# Patient Record
Sex: MALE | Race: BLACK OR AFRICAN AMERICAN
[De-identification: names, ages, dates, MRNs, and addresses within clinical notes are randomized per-mention and may not be internally consistent; named-entity substitution may affect disease eponyms.]

---

## 2019-12-09 ENCOUNTER — HOSPITAL ENCOUNTER (EMERGENCY)
Dept: HOSPITAL 74 - JER | Age: 39
Discharge: HOME | End: 2019-12-09
Payer: COMMERCIAL

## 2019-12-09 VITALS — TEMPERATURE: 99.9 F | DIASTOLIC BLOOD PRESSURE: 84 MMHG | HEART RATE: 110 BPM | SYSTOLIC BLOOD PRESSURE: 133 MMHG

## 2019-12-09 VITALS — BODY MASS INDEX: 30.7 KG/M2

## 2019-12-09 DIAGNOSIS — B95.0: ICD-10-CM

## 2019-12-09 DIAGNOSIS — J02.0: Primary | ICD-10-CM

## 2019-12-09 NOTE — PDOC
History of Present Illness





- General


Chief Complaint: Cold Symptoms


Stated Complaint: THROAT PAIN


Time Seen by Provider: 12/09/19 04:12


History Source: Patient


Exam Limitations: No Limitations





- History of Present Illness


Initial Comments: 





12/09/19 04:21


Harsha Morgan is a 38y previously healthy M presenting w sore throat, nasal 

congestion, fevers. Progressively worsening for past 3d. Took motrin without 

relief. Today developed non productive intermittent cough and SOB. Denies 

headache, nausea/vomiting, chest pain. Wants throat swab to rule out strep





Past History





- Past Medical History


Allergies/Adverse Reactions: 


 Allergies











Allergy/AdvReac Type Severity Reaction Status Date / Time


 


No Known Allergies Allergy   Verified 12/09/19 04:31














**Review of Systems





- Review of Systems


Constitutional: Yes: Fever.  No: Chills


HEENTM: Yes: Nose Congestion, Throat Pain.  No: Eye Pain, Recent change in 

vision


Respiratory: Yes: Cough, Shortness of Breath


Cardiac (ROS): No: Chest Pain, Palpitations, Syncope


ABD/GI: No: Abdominal Distended, Constipated, Diarrhea, Nausea, Vomiting


: No: Burning, Dysuria, Discharge, Hematuria


Musculoskeletal: No: Back Pain, Neck Pain


Integumentary: No: Bruising, Flushing, Lesions


Neurological: No: Headache, Seizure, Tingling, Tremors


Psychiatric: No: Anxiety, Depression


Endocrine: No: Excessive Sweating, Flushing, Intolerance to Cold, Intolerance 

to Heat


Hematologic/Lymphatic: No: Anemia, Blood Clots





*Physical Exam





- Physical Exam


General Appearance: Yes: Nourished, Appropriately Dressed.  No: Apparent 

Distress


HEENT: positive: EOMI, MERYL, Normal Voice, Pharynx Normal, Nasal Congestion, 

Rhinorrhea, Hearing Grossly Normal, Other (no anterior tender/swollen 

lymphadenopathy ).  negative: Scleral Icterus (R), Scleral Icterus (L), 

Pharyngeal Erythema, Tonsillar Exudate, Tonsillar Erythema, Sinus Tenderness


Respiratory/Chest: positive: Lungs Clear, Normal Breath Sounds.  negative: 

Chest Tender, Respiratory Distress, Crackles, Rales, Rhonchi, Stridor, Wheezing


Cardiovascular: positive: Regular Rhythm, Regular Rate, S1, S2.  negative: Edema

, Murmur


Integumentary: positive: Normal Color


Neurologic: positive: CNs II-XII NML intact, Fully Oriented, Alert, Normal Mood/

Affect, Normal Response, Responsive.  negative: Sensory Deficit, Confused, 

Disoriented





Medical Decision Making





- Medical Decision Making





12/09/19 04:24


Harsha Morgan is a 38y previously healthy M presenting w sore throat, nasal 

congestion, fevers, cough likely d/t viral URI. Low concern for strep throat (1 

score Centor criteria) vs PNA (clear lung sounds). Pending strep swab. DC home 

w PCP f/u, callback reminder





Discharge





- Discharge Information


Problems reviewed: Yes


Clinical Impression/Diagnosis: 


 Viral URI with cough





Condition: Good


Disposition: HOME





- Admission


No





- Follow up/Referral


Referrals: 


Vinh Ribera [Primary Care Provider] - 


CallBack Reminder: 


Strep test results





- Patient Discharge Instructions


Patient Printed Discharge Instructions:  DI for Viral Upper Respiratory 

Infection -- Adult


Additional Instructions: 


You were seen for sore throat and cough. We will call you if you have strep 

throat





Drink lots of water. You can take tylenol or ibuprofen if you have fevers





Come back to the ED if you have chest pain, trouble breathing, or start 

vomiting.





- Post Discharge Activity

## 2020-08-02 ENCOUNTER — HOSPITAL ENCOUNTER (EMERGENCY)
Dept: HOSPITAL 74 - JER | Age: 40
Discharge: HOME | End: 2020-08-02
Payer: COMMERCIAL

## 2020-08-02 VITALS — HEART RATE: 57 BPM | SYSTOLIC BLOOD PRESSURE: 135 MMHG | DIASTOLIC BLOOD PRESSURE: 86 MMHG | TEMPERATURE: 98.1 F

## 2020-08-02 VITALS — BODY MASS INDEX: 29.8 KG/M2

## 2020-08-02 DIAGNOSIS — R07.9: Primary | ICD-10-CM

## 2020-08-02 DIAGNOSIS — J02.0: ICD-10-CM

## 2020-08-02 LAB
ALBUMIN SERPL-MCNC: 3.9 G/DL (ref 3.4–5)
ALP SERPL-CCNC: 82 U/L (ref 45–117)
ALT SERPL-CCNC: 28 U/L (ref 13–61)
ANION GAP SERPL CALC-SCNC: 4 MMOL/L (ref 8–16)
AST SERPL-CCNC: 24 U/L (ref 15–37)
BASOPHILS # BLD: 1.3 % (ref 0–2)
BILIRUB SERPL-MCNC: 0.5 MG/DL (ref 0.2–1)
BUN SERPL-MCNC: 12.8 MG/DL (ref 7–18)
CALCIUM SERPL-MCNC: 9.5 MG/DL (ref 8.5–10.1)
CHLORIDE SERPL-SCNC: 104 MMOL/L (ref 98–107)
CO2 SERPL-SCNC: 31 MMOL/L (ref 21–32)
CREAT SERPL-MCNC: 1.2 MG/DL (ref 0.55–1.3)
DEPRECATED RDW RBC AUTO: 13.9 % (ref 11.9–15.9)
EOSINOPHIL # BLD: 1.7 % (ref 0–4.5)
GLUCOSE SERPL-MCNC: 97 MG/DL (ref 74–106)
HCT VFR BLD CALC: 39.4 % (ref 35.4–49)
HGB BLD-MCNC: 13 GM/DL (ref 11.7–16.9)
LYMPHOCYTES # BLD: 35.5 % (ref 8–40)
MCH RBC QN AUTO: 27.4 PG (ref 25.7–33.7)
MCHC RBC AUTO-ENTMCNC: 32.9 G/DL (ref 32–35.9)
MCV RBC: 83.2 FL (ref 80–96)
MONOCYTES # BLD AUTO: 10.3 % (ref 3.8–10.2)
NEUTROPHILS # BLD: 51.2 % (ref 42.8–82.8)
PLATELET # BLD AUTO: 278 K/MM3 (ref 134–434)
PMV BLD: 7.8 FL (ref 7.5–11.1)
POTASSIUM SERPLBLD-SCNC: 3.9 MMOL/L (ref 3.5–5.1)
PROT SERPL-MCNC: 7.9 G/DL (ref 6.4–8.2)
RBC # BLD AUTO: 4.74 M/MM3 (ref 4–5.6)
SODIUM SERPL-SCNC: 139 MMOL/L (ref 136–145)
WBC # BLD AUTO: 4.8 K/MM3 (ref 4–10)

## 2020-08-02 NOTE — PDOC
*Physical Exam





- Vital Signs


                                Last Vital Signs











Temp Pulse Resp BP Pulse Ox


 


 98.8 F   72   20   148/89   99 


 


 08/02/20 04:35  08/02/20 04:35  08/02/20 04:35  08/02/20 04:35  08/02/20 04:35














- Physical Exam


General Appearance: Yes: Appropriately Dressed.  No: Apparent Distress


HEENT: positive: EOMI, Normal Voice, Pharyngeal Erythema


Neck: positive: Trachea midline.  negative: Tender


Respiratory/Chest: positive: Lungs Clear, Normal Breath Sounds.  negative: 

Respiratory Distress


Cardiovascular: positive: Regular Rhythm, Regular Rate, S1, S2


Gastrointestinal/Abdominal: positive: Flat, Soft.  negative: Tender


Musculoskeletal: positive: Normal Inspection.  negative: CVA Tenderness


Extremity: positive: Normal Capillary Refill, Normal Inspection, Normal Range of

Motion


Integumentary: positive: Normal Color, Dry, Warm


Neurologic: positive: Fully Oriented, Alert, Normal Mood/Affect





ED Treatment Course





- LABORATORY


CBC & Chemistry Diagram: 


                                 08/02/20 05:57





                                 08/02/20 05:57





- ADDITIONAL ORDERS


Additional order review: 


                               Laboratory  Results











  08/02/20





  05:57


 


Sodium  139


 


Potassium  3.9


 


Chloride  104


 


Carbon Dioxide  31


 


Anion Gap  4 L


 


BUN  12.8


 


Creatinine  1.2


 


Est GFR (CKD-EPI)AfAm  87.75


 


Est GFR (CKD-EPI)NonAf  75.72


 


Random Glucose  97


 


Calcium  9.5


 


Total Bilirubin  0.5


 


AST  24


 


ALT  28


 


Alkaline Phosphatase  82


 


Troponin I  < 0.02


 


Total Protein  7.9


 


Albumin  3.9








                                        











  08/02/20





  05:57


 


RBC  4.74


 


MCV  83.2


 


MCHC  32.9


 


RDW  13.9


 


MPV  7.8


 


Neutrophils %  51.2


 


Lymphocytes %  35.5


 


Monocytes %  10.3 H


 


Eosinophils %  1.7


 


Basophils %  1.3














- Medications


Given in the ED: 


ED Medications














Discontinued Medications














Generic Name Dose Route Start Last Admin





  Trade Name Freq  PRN Reason Stop Dose Admin


 


Aspirin  324 mg  08/02/20 05:37  08/02/20 06:17





  Asa -  PO  08/02/20 05:38  324 mg





  ONCE ONE   Administration














Medical Decision Making





- Medical Decision Making


38 yo male with no PMH presents with chest pain and throat pain. Found to be 

positive for Group A Strep. 








EKG and troponin are negative. 








Streptococcal Group A is positive


Prescribed Penicillin  BID for 10 days. 





Pt stable for discharge.








Discharge





- Discharge Information


Problems reviewed: Yes


Clinical Impression/Diagnosis: 


 Acute streptococcal pharyngitis





Chest pain


Qualifiers:


 Chest pain type: unspecified Qualified Code(s): R07.9 - Chest pain, unspecified





Condition: Stable


Disposition: HOME





- Admission


No





- Additional Discharge Information


Prescriptions: 


Penicillin V Potassium [Pen Vee K -] 500 mg PO BID 10 Days #20 tablet





- Follow up/Referral


Referrals: 


Vinh Ribera [Primary Care Provider] - 





- Patient Discharge Instructions


Additional Instructions: 


Follow up with your PCP for further follow up and evaluation of your condition. 

Take your antibiotic as prescribed (2 pills per day for 10 days). Take tylenol 

to manage your fever as directed by the  instructions. Stay hydrated

and well nourished at home. Return to the ED if your condition worsens and/or 

you experience worsening fevers, chest pain, shortness of breath, rashes, 

nausea, vomiting, abdominal pain. 





- Post Discharge Activity

## 2020-08-02 NOTE — PDOC
History of Present Illness





- General


Chief Complaint: Sore Throat


Stated Complaint: breathing problem





- History of Present Illness


Initial Comments: 





08/02/20 06:04


38 y/o M no significant medical hx, presents to the ED with intermittent chest 

pain. pt reports he has had intermittent episodes for the last few days, lasting

a few minutes. episodes are not accompanied by shortness of breath, nausea, 

vomiting, diaphoresis. Last episode occurred 3 hrs before ED presentation when 

he was heading to the bathroom. Pt denies chest pain right now at the time of 

this hx, He has had associated throat discomfort for the last 2 weeks and his 

daughter recently had a strep throat infection.


He denies any cough, wheezing, fevers, chills, trauma or falls. 


PMHx: as noted above


ROS: as noted


SHx: occasional EtoH use, denies drug use. 


Allergies: NKDA











ROS: 


GENERAL/CONSTITUTIONAL: No fever or chills. No weakness.


HEAD, EYES, EARS, NOSE AND THROAT: No change in vision. No ear pain or 

discharge. No sore throat.


CARDIOVASCULAR: No chest pain or shortness of breath


RESPIRATORY: No cough, wheezing, or hemoptysis.


GASTROINTESTINAL: No nausea, vomiting, diarrhea or constipation.


GENITOURINARY: No dysuria, frequency, or change in urination.


MUSCULOSKELETAL: No joint or muscle swelling or pain. No neck or back pain.


SKIN: No rash


NEUROLOGIC: No headache, vertigo, loss of consciousness, or change in 

strength/sensation.


ENDOCRINE: No increased thirst. No abnormal weight change


HEMATOLOGIC/LYMPHATIC: No anemia, easy bleeding, or history of blood clots.


ALLERGIC/IMMUNOLOGIC: No hives or skin allergy.





PE: 


GENERAL: Awake, alert, and fully oriented, in no acute distress


HEAD: No signs of trauma, normocephalic, atraumatic 


EYES: PERRLA, EOMI, sclera anicteric, conjunctiva clear


ENT: Auricles normal inspection, hearing grossly normal, nares patent, orophary

nx clear without exudates. Moist mucosa


NECK: Normal ROM, supple, no lymphadenopathy, JVD, or masses


LUNGS: No distress, speaks full sentences, clear to auscultation bilaterally 


HEART: Regular rate and rhythm, normal S1 and S2, no murmurs, rubs or gallops, 

peripheral pulses normal and equal bilaterally. 


ABDOMEN: Soft, nontender, normoactive bowel sounds.  No guarding, no rebound.  

No masses


EXTREMITIES : Normal inspection, Normal range of motion, no edema.  No clubbing 

or cyanosis


NEUROLOGICAL: Cranial nerves II through XII grossly intact.  Normal speech, 

normal gait, no focal sensorimotor deficits 


SKIN: Warm, Dry, normal turgor, no rashes or lesions noted


08/02/20 19:54








Past History





- Medical History


Allergies/Adverse Reactions: 


                                    Allergies











Allergy/AdvReac Type Severity Reaction Status Date / Time


 


No Known Allergies Allergy   Verified 08/02/20 04:35











Home Medications: 


Ambulatory Orders





Penicillin V Potassium [Pen Vee K -] 500 mg PO BID 10 Days #20 tablet 08/02/20 








COPD: No





- Psycho-Social/Smoking History


Smoking History: Never smoked


Have you smoked in the past 12 months: No


Information on smoking cessation initiated: No





- Substance Abuse Hx (Audit-C & DAST Scrn)


How often the patient has a drink containing alcohol: Never


Score: In Men: 4 or > Positive; In Women: 3 or > Positive: 0


Screen Result (Pos requires Nsg. Audit-10AR): Negative


In the last yr the pt used illegal drug/Rx for NonMed reason: No


Score:  Yes response is considered Positive: 0


Screen Result (Positive result requires Nsg. DAST-10): Negative





*Physical Exam





- Vital Signs


                                Last Vital Signs











Temp Pulse Resp BP Pulse Ox


 


 98.8 F   72   20   148/89   99 


 


 08/02/20 04:35  08/02/20 04:35  08/02/20 04:35  08/02/20 04:35  08/02/20 04:35














ED Treatment Course





- LABORATORY


CBC & Chemistry Diagram: 


                                 08/02/20 05:57





                                 08/02/20 05:57





Medical Decision Making





- Medical Decision Making





08/02/20 06:15


38 y/o M no significant medical hx, presents to the ED with intermittent chest 

pain. pt reports he has had intermitten episodes for the last few days, lasting 

a few minutes.





labs, strep swab, ekg





pt in NAD at this time


ddx: strep throat, acs, 


pt refused IV 


08/02/20 06:25





08/02/20 06:34


trop negative


CXR, strep throat, pending


EKG: nsr, normal EKG no st elevations. T waves prominent 


signed out to day team 


08/02/20 19:57








Discharge





- Discharge Information


Problems reviewed: Yes


Clinical Impression/Diagnosis: 


 Acute streptococcal pharyngitis





Chest pain


Qualifiers:


 Chest pain type: unspecified Qualified Code(s): R07.9 - Chest pain, unspecified





Condition: Stable


Disposition: HOME





- Additional Discharge Information


Prescriptions: 


Penicillin V Potassium [Pen Vee K -] 500 mg PO BID 10 Days #20 tablet





- Follow up/Referral


Referrals: 


FriendVinh [Primary Care Provider] - 





- Patient Discharge Instructions


Additional Instructions: 


Follow up with your PCP for further follow up and evaluation of your condition. 

Take your antibiotic as prescribed (2 pills per day for 10 days). Take tylenol t

o manage your fever as directed by the  instructions. Stay hydrated 

and well nourished at home. Return to the ED if your condition worsens and/or 

you experience worsening fevers, chest pain, shortness of breath, rashes, 

nausea, vomiting, abdominal pain. 





- Post Discharge Activity

## 2020-08-02 NOTE — PDOC
Attending Attestation





- Resident


Resident Name: Manohar Raomarty





- ED Attending Attestation


I have performed the following: I have examined & evaluated the patient, The 

case was reviewed & discussed with the resident, I agree w/resident's findings &

plan





- HPI


HPI: 





08/02/20 06:32


Pt comes with a couple days of chest pain described as a discrete spot of 

burning that he feels in the center of his chest. Feels like the burn after a 

run.  However pt doesn't work out and he hasn't worked out in 6 years since his 

twins were born.


Pt is not a smoker and doesn't use drugs


Pt drinks a drink at night on occasion


Pt has no SOB and no cough no fever and no other systemic symptoms


He has no PMHx. Fam hx of HTN





- Physicial Exam


PE: 





08/02/20 06:35


Normal exam


Agree with resident exam





- Medical Decision Making





08/02/20 06:35


labs cxr ekg all pending


If normal, pt is stable to go home.


08/02/20 06:58


labs normal


EKG NSR


Pt will be signed out to the day team. If CXR normal, he can go home. 





Discharge





- Discharge Information


Problems reviewed: Yes


Clinical Impression/Diagnosis: 


 Acute streptococcal pharyngitis





Chest pain


Qualifiers:


 Chest pain type: unspecified Qualified Code(s): R07.9 - Chest pain, unspecified





Condition: Stable


Disposition: HOME





- Additional Discharge Information


Prescriptions: 


Penicillin V Potassium [Pen Vee K -] 500 mg PO BID 10 Days #20 tablet





- Follow up/Referral


Referrals: 


Vinh Ribera [Primary Care Provider] - 





- Patient Discharge Instructions


Additional Instructions: 


Follow up with your PCP for further follow up and evaluation of your condition. 

Take your antibiotic as prescribed (2 pills per day for 10 days). Take tylenol 

to manage your fever as directed by the  instructions. Stay hydrated

and well nourished at home. Return to the ED if your condition worsens and/or 

you experience worsening fevers, chest pain, shortness of breath, rashes, 

nausea, vomiting, abdominal pain. 





- Post Discharge Activity

## 2020-08-03 NOTE — EKG
Test Reason : 

Blood Pressure : ***/*** mmHG

Vent. Rate : 062 BPM     Atrial Rate : 062 BPM

   P-R Int : 170 ms          QRS Dur : 092 ms

    QT Int : 386 ms       P-R-T Axes : 013 063 035 degrees

   QTc Int : 391 ms

 

NORMAL SINUS RHYTHM

NORMAL ECG

NO PREVIOUS ECGS AVAILABLE

Confirmed by Claudia Montes De Oca ([id number]) on 8/3/2020 10:00:49 AM

 

Referred By:             Confirmed By:Claudia Montes De Oca
